# Patient Record
Sex: MALE | Race: WHITE | URBAN - METROPOLITAN AREA
[De-identification: names, ages, dates, MRNs, and addresses within clinical notes are randomized per-mention and may not be internally consistent; named-entity substitution may affect disease eponyms.]

---

## 2019-01-15 ENCOUNTER — OFFICE VISIT - RIVER FALLS (OUTPATIENT)
Dept: FAMILY MEDICINE | Facility: CLINIC | Age: 49
End: 2019-01-15

## 2019-01-15 ASSESSMENT — MIFFLIN-ST. JEOR: SCORE: 2014.3

## 2019-01-16 LAB
BUN SERPL-MCNC: 6 MG/DL (ref 7–25)
BUN/CREAT RATIO - HISTORICAL: 8 (ref 6–22)
CALCIUM SERPL-MCNC: 9.7 MG/DL (ref 8.6–10.3)
CHLORIDE BLD-SCNC: 104 MMOL/L (ref 98–110)
CO2 SERPL-SCNC: 28 MMOL/L (ref 20–32)
CREAT SERPL-MCNC: 0.72 MG/DL (ref 0.6–1.35)
EGFRCR SERPLBLD CKD-EPI 2021: 112 ML/MIN/1.73M2
GLUCOSE BLD-MCNC: 94 MG/DL (ref 65–99)
POTASSIUM BLD-SCNC: 4 MMOL/L (ref 3.5–5.3)
SODIUM SERPL-SCNC: 139 MMOL/L (ref 135–146)
TSH SERPL DL<=0.005 MIU/L-ACNC: 2.15 MIU/L (ref 0.4–4.5)

## 2022-02-11 VITALS
WEIGHT: 228.6 LBS | DIASTOLIC BLOOD PRESSURE: 90 MMHG | HEART RATE: 116 BPM | TEMPERATURE: 99.2 F | SYSTOLIC BLOOD PRESSURE: 150 MMHG | BODY MASS INDEX: 26.99 KG/M2 | HEIGHT: 77 IN

## 2022-02-16 NOTE — RESULTS
Patient:   ANDREW ONEAL            MRN: [id number]            FIN: [id number]               Age:   48 years     Sex:  Male     :  1970   Associated Diagnoses:   None   Author:   Gonzalo HUANG, Salvador      Procedure   EKG procedure   Date:  1/15/2019.     Indication: tachycardia.     EKG findings   Rhythm: heart rate  98  beats/min, sinus normal.     Axis: normal axis, normal configuration.     Intervals: normal.     P waves: normal.     QRS complex: normal.     ST-T-U complex: normal.     Interpretation: normal EKG.

## 2022-02-16 NOTE — NURSING NOTE
Comprehensive Intake Entered On:  1/15/2019 3:05 PM CST    Performed On:  1/15/2019 2:59 PM CST by Fauzia Gibson MA               Summary   Chief Complaint :   c/o increased HR, dizzy, shakey feeling this afternoon.   has been up since 7PM last night, also not able to sleep.  denies chest pain.  mother recently admitted into hospice.   Weight Measured :   228.6 lb(Converted to: 228 lb 10 oz, 103.69 kg)    Height Measured :   77 in(Converted to: 6 ft 5 in, 195.58 cm)    Body Mass Index :   27.11 kg/m2 (HI)    Body Surface Area :   2.37 m2   Systolic Blood Pressure :   150 mmHg (HI)    Diastolic Blood Pressure :   90 mmHg (HI)    Mean Arterial Pressure :   110 mmHg   Peripheral Pulse Rate :   116 bpm (HI)    BP Site :   Right arm   Pulse Site :   Radial artery   BP Method :   Manual   HR Method :   Manual   Temperature Tympanic :   99.2 DegF(Converted to: 37.3 DegC)    Fauzia Gibson MA - 1/15/2019 2:59 PM CST   Health Status   Allergies Verified? :   Yes   Medication History Verified? :   Yes   Medical History Verified? :   Yes   Pre-Visit Planning Status :   Not completed   Tobacco Use? :   Current every day smoker   Fauzia Gibson MA - 1/15/2019 2:59 PM CST   Consents   Consent for Immunization Exchange :   Consent Granted   Consent for Immunizations to Providers :   Consent Granted   Fauzia Gibson MA - 1/15/2019 2:59 PM CST   Meds / Allergies   (As Of: 1/15/2019 3:05:49 PM CST)   Allergies (Active)   No Known Medication Allergies  Estimated Onset Date:   Unspecified ; Created By:   Fauzia Gibson MA; Reaction Status:   Active ; Category:   Drug ; Substance:   No Known Medication Allergies ; Type:   Allergy ; Updated By:   Fauzia Gibson MA; Source:   Patient ; Reviewed Date:   1/15/2019 3:03 PM CST        Medication List   (As Of: 1/15/2019 3:05:49 PM CST)   No Known Home Medications     Fauzia Gibson MA - 1/15/2019 3:02:56 PM           Social History   Social History   (As Of: 1/15/2019 3:05:49 PM CST)    Tobacco:        10 or more cigarettes (1/2 pack or more)/day in last 30 days   (Last Updated: 1/15/2019 3:03:14 PM CST by Fauzia Gibson MA)

## 2022-02-16 NOTE — PROGRESS NOTES
Chief Complaint    c/o increased HR, dizzy, shakey feeling this afternoon.   has been up since 7PM last night, also not able to sleep.  denies chest pain.  mother recently admitted into hospice.  History of Present Illness      Patient is here for increased HR, feeling dizzy and shaky since this afternoon.  He works late shift and does find it hard to get good sleep.  He also recently stated that his mother was admitted into hospice about 10 days ago.  He does have family history of HTN, denies fever, poor appetite.  He does smoke cigarettes up to 3 packs per day lately, is a regular coffee drinker and has been drinking 12-15 beers daily for the past few days.  He also denies chest pain.  Review of Systems           See HPI.  All other review of systems negative.              Physical Exam   Vitals & Measurements    T: 99.2   F (Tympanic)  HR: 116(Peripheral)  BP: 150/90     HT: 77 in  WT: 228.6 lb  BMI: 27.11           General:  Alert and oriented, No acute distress.            Eye:  Pupils are equal, round and reactive to light, Normal conjunctiva.            HENT:  Oral mucosa is moist.            Neck:  Supple.            Respiratory:  Respirations are non-labored.            Cardiovascular:  Normal rate, Regular rhythm, No edema.  No chest pressure.          Gastrointestinal:  Non-distended.  No issues with constipation and diarrhea.           Musculoskeletal:  Normal gait.  Increased shakiness.            Neurological:  fine hand tremor            Integumentary:  Warm, No rash.            Psychiatric:  Cooperative, Appropriate mood & affect, Normal judgment.                 ECG:  NSR, no ischemia  Assessment/Plan       1. Increased heart rate (R00.0)         Discussed etiology for increased HR.  EKG revealed normal HR, no irregular heart beats or heart issues.  BMP and TSH today.  Try to cut down on cigarettes, ETOH intake and caffeine consumption.  Is cautioned about withdrawal symptoms if he stops ETOH  suddenly.  Also discussed smoking cessation and Chantix, attending AA meetings.  Will pursue after dealing with mother's impending passing.  Will prescribe Lorazepam, short term, to help calm nerves and blunt withdrawal effects.  Will prescribe Chantix starter pack and Chantix 1mg tabs bid #60 with 1 refill.   Smoking Cessation folder also given to patient for further resources.      I, Fauzia Gibson MA, acted solely as a scribe for, and in the presence of Dr. Salvador Sánchez who performed the services.             I, Salvador Sánchez MD, personally performed the services described in this documentation.  The documentation was scribed in my presence and is both accurate and complete.                Patient Information     Name:ANDREW ONEAL      Address:      [address] [place] [address]       [place] [address]     Sex:Male     YOB: 1970     [phone]     MRN:[id number]     [facility]:[id number]     Location:[facility]     Date of Service:01/15/2019      Primary Care Physician:       Salvador Sánchez MD, [phone]      Attending Physician:       Salvador Sánchez MD, [phone]  Problem List/Past Medical History    Ongoing     Tobacco user    Historical     No qualifying data  Medications     Chantix Starter Pack 0.5 mg-1 mg oral tablet: See Instructions, take as directed, 1 EA, 0 Refill(s).     Chantix 1 mg oral tablet: 1 mg, 1 tab(s), Oral, bid, 60 tab(s), 1 Refill(s).     LORazepam 1 mg oral tablet: 1 mg, 1 tab(s), Oral, bid, PRN: for symptoms of alcohol withdrawal, 12 tab(s), 1 Refill(s).          Allergies    No Known Medication Allergies  Social History    Smoking Status - 01/15/2019     Current every day smoker     Alcohol      Current, Beer (12 oz), Several times per day, Ready to change: Yes., 01/15/2019     Tobacco      10 or more cigarettes (1/2 pack or more)/day in last 30 days, Cigarettes, Ready to change: Yes., 01/15/2019

## 2022-02-16 NOTE — LETTER
(Inserted Image. Unable to display)   January 17, 2019        ANDREW ONEAL      [address] [facility]   [place], [place] [id number]        Dear ANDREW,    Thank you for selecting [facility] for your health care needs.  Below you will find the results of your recent test(s) done at our clinic.    The tests were normal.      Result Name Current Result Reference Range   Sodium Level (mmol/L)  139 1/15/2019 135 - 146   Potassium Level (mmol/L)  4.0 1/15/2019 3.5 - 5.3   Chloride Level (mmol/L)  104 1/15/2019 98 - 110   CO2 Level (mmol/L)  28 1/15/2019 20 - 32   Glucose Level (mg/dL)  94 1/15/2019 65 - 99   BUN (mg/dL) ((L)) 6 1/15/2019 7 - 25   Creatinine Level (mg/dL)  0.72 1/15/2019 0.60 - 1.35   BUN/Creat Ratio  8 1/15/2019 6 - 22   eGFR (mL/min/1.73m2)  112 1/15/2019 > OR = 60 -    Calcium Level (mg/dL)  9.7 1/15/2019 8.6 - 10.3   TSH (mIU/L)  2.15 1/15/2019 0.40 - 4.50       Please contact me or my assistant at [phone] if you have any questions about your results.    Sincerely,        Fei Sánchez MD        What do your labs mean?  Below is a glossary of commonly ordered labs:  LDL   Bad Cholesterol   HDL   Good Cholesterol  AST/ALT   Liver Function   Cr/Creatinine   Kidney Function  Microalbumin   Kidney Function  BUN   Kidney Function  PSA   Prostate    TSH   Thyroid Hormone  HgbA1c   Diabetes Test   Hgb (Hemoglobin)   Red Blood Cells